# Patient Record
Sex: MALE | Race: WHITE | NOT HISPANIC OR LATINO | Employment: FULL TIME | URBAN - METROPOLITAN AREA
[De-identification: names, ages, dates, MRNs, and addresses within clinical notes are randomized per-mention and may not be internally consistent; named-entity substitution may affect disease eponyms.]

---

## 2017-01-17 ENCOUNTER — TELEPHONE (OUTPATIENT)
Dept: FAMILY MEDICINE CLINIC | Facility: CLINIC | Age: 27
End: 2017-01-17

## 2017-01-17 NOTE — TELEPHONE ENCOUNTER
Pt called-has a new pt appt scheduled with you on 2-20-17.  Went to Urgent Care at [facility] yesterday and was told his WBC's are low.  Does he need to see you sooner?  Please advise.   says no new pt appts available before this date.

## 2017-01-19 ENCOUNTER — OFFICE VISIT (OUTPATIENT)
Dept: FAMILY MEDICINE CLINIC | Facility: CLINIC | Age: 27
End: 2017-01-19

## 2017-01-19 VITALS
HEIGHT: 71 IN | SYSTOLIC BLOOD PRESSURE: 118 MMHG | DIASTOLIC BLOOD PRESSURE: 80 MMHG | WEIGHT: 172.4 LBS | RESPIRATION RATE: 16 BRPM | TEMPERATURE: 98.3 F | OXYGEN SATURATION: 100 % | BODY MASS INDEX: 24.14 KG/M2 | HEART RATE: 59 BPM

## 2017-01-19 DIAGNOSIS — Z00.00 HEALTH CARE MAINTENANCE: Primary | ICD-10-CM

## 2017-01-19 DIAGNOSIS — R21 RASH AND NONSPECIFIC SKIN ERUPTION: ICD-10-CM

## 2017-01-19 DIAGNOSIS — B34.9 VIRAL ILLNESS: ICD-10-CM

## 2017-01-19 DIAGNOSIS — D72.819 LEUKOPENIA, UNSPECIFIED TYPE: ICD-10-CM

## 2017-01-19 PROBLEM — F41.9 ANXIETY: Status: ACTIVE | Noted: 2017-01-19

## 2017-01-19 PROCEDURE — 99385 PREV VISIT NEW AGE 18-39: CPT | Performed by: FAMILY MEDICINE

## 2017-01-19 PROCEDURE — 99213 OFFICE O/P EST LOW 20 MIN: CPT | Performed by: FAMILY MEDICINE

## 2017-01-19 RX ORDER — PROPRANOLOL HYDROCHLORIDE 10 MG/1
TABLET ORAL
COMMUNITY
Start: 2017-01-06

## 2017-01-19 RX ORDER — ESCITALOPRAM OXALATE 20 MG/1
20 TABLET ORAL
COMMUNITY

## 2017-01-19 NOTE — MR AVS SNAPSHOT
Shakeel Flores   2017 8:00 AM   Office Visit    Dept Phone:  [phone]   Encounter #:  [id number]    Provider:  Stephen Maher MD   Department:  [facility] PRIMARY [facility]ARE                Your Full Care Plan              Your Updated Medication List          This list is accurate as of: 17  8:37 AM.  Always use your most recent med list.                escitalopram 20 MG tablet   Commonly known as:  LEXAPRO       propranolol 10 MG tablet   Commonly known as:  INDERAL               We Performed the Following     B. Burgdorferi Antibodies, WB Reflex     CBC & Differential     Comprehensive Metabolic Panel     Lipid Panel       You Were Diagnosed With        Codes Comments    Rash and nonspecific skin eruption    -  Primary ICD-10-CM: R21  ICD-9-CM: 782.1     Viral illness     ICD-10-CM: B34.9  ICD-9-CM: 079.99     Leukopenia, unspecified type     ICD-10-CM: D72.819  ICD-9-CM: 288.50     Health care maintenance     ICD-10-CM: Z00.00  ICD-9-CM: V70.0       Instructions     None    Patient Instructions History      Upcoming Appointments     Visit Type Date Time Department    NEW PATIENT 2017  8:00 AM Unified Office    PHYSICAL 2017 11:00 AM "[employer] Signup     [facility]ny allows you to send messages to your doctor, view your test results, renew your prescriptions, schedule appointments, and more. To sign up, go to [employer] and click on the Sign Up Now link in the New User? box. Enter your Cynny Activation Code exactly as it appears below along with the last four digits of your Social Security Number and your Date of Birth () to complete the sign-up process. If you do not sign up before the expiration date, you must request a new code.    Cynny Activation Code: [email or web address]  Expires: 2017  8:36 AM    If you have questions, you can email [email or web address] or call [phone] to  "talk to our MyChart staff. Remember, MyChart is NOT to be used for urgent needs. For medical emergencies, dial 911.               Other Info from Your Visit           Your Appointments     Feb 20, 2017 11:00 AM EST   Physical with Stephen Maher MD   [facility]IMARY [facility] (--)    [address]  [facility] [address] [phone]           Arrive 15 minutes prior to appointment.              Allergies     Cefaclor  Rash      Reason for Visit     Abnormal Lab decreased WBC's at [facility]      Vital Signs     Blood Pressure Pulse Temperature Respirations Height Weight    118/80 59 98.3 °F (36.8 °C) (Oral) 16 71\" (180.3 cm) 172 lb 6.4 oz (78.2 kg)    Oxygen Saturation Body Mass Index Smoking Status             100% 24.04 kg/m2 Never Smoker         Problems and Diagnoses Noted     Anxiety problem    Nasal inflammation due to allergen    Rash and nonspecific skin eruption    -  Primary    Viral illness        Decreased white blood cell count        Health maintenance examination            "

## 2017-01-19 NOTE — PROGRESS NOTES
Subjective   Shakeel Flores is a 26 y.o. male.     Chief Complaint   Patient presents with   • Abnormal Lab     decreased WBC's at [facility]    also here for complete physical examination.    History of Present Illness    About 11 or 12 days ago started with some headaches.  It will bother him in the morning for an hour or 2 and then get better.  Mild to moderate headaches.  Then would feel lightheaded at times when exercising.  He had decreased exercise tolerance for the better part of a week.  Some fatigue.  Then over last weekend some fever-like symptoms.  He broke out with a sweat and a rash Saturday night.  Rash mostly on the thighs and buttocks.  Does not appear to bother him too much.  Since then the fevers have broken and overall is feeling better.  He has had no specific flulike symptoms.  No sore throat.  No sinus symptoms.  No cough.  No nausea no vomiting.  No diarrhea.  No dysuria.  He does have some new sweatpants he wore but the underwear is not new.  No other known contact issues to the buttocks and thighs.  He traveled to [place] in October.  He was hiking in the [place] for a day or 2 early November, it was warm, he does not recall any insect bites.  Nor tick bites.  He was seen at the [place] urgent care center recently.  Negative flu test.  White count was 2800 with a platelet count of 144,000 normal hemoglobin.  He was told to follow-up with us for recheck.  No specific diagnosis was given.    He's also here as a new patient for complete physical examination.  He does not smoke.  He describes some alcohol use.  No drug use.  He exercises frequently.  He works as an .    He has remote history of anxiety.  He's been taking Lexapro for some time.  He uses propranolol prescribed by a psychiatrist also for intermittent social phobia.      The following portions of the patient's history were reviewed and updated as appropriate: allergies, current medications, past family history, past medical  "history, past social history, past surgical history and problem list.          Review of Systems   Constitutional: Positive for fatigue and fever. Negative for activity change.   Eyes: Negative.    Respiratory: Negative for cough, chest tightness, shortness of breath and wheezing.    Cardiovascular: Negative for chest pain, palpitations and leg swelling.   Gastrointestinal: Negative for abdominal pain, blood in stool, constipation, diarrhea, nausea and vomiting.   Endocrine: Negative.    Genitourinary: Negative for difficulty urinating, dysuria and hematuria.   Musculoskeletal: Negative.    Skin: Positive for rash.   Neurological: Positive for headaches ( now all but gone).   Psychiatric/Behavioral: Negative.  Negative for behavioral problems. The patient is not nervous/anxious.    All other systems reviewed and are negative.      Objective   Blood pressure 118/80, pulse 59, temperature 98.3 °F (36.8 °C), temperature source Oral, resp. rate 16, height 71\" (180.3 cm), weight 172 lb 6.4 oz (78.2 kg), SpO2 100 %.  Physical Exam   Constitutional: He is oriented to person, place, and time. He appears well-developed and well-nourished. No distress.   HENT:   Head: Normocephalic and atraumatic.   Right Ear: Tympanic membrane, external ear and ear canal normal.   Left Ear: Tympanic membrane, external ear and ear canal normal.   Nose: Nose normal.   Mouth/Throat: Oropharynx is clear and moist. No oropharyngeal exudate.   Eyes: Conjunctivae and EOM are normal. Pupils are equal, round, and reactive to light.   Neck: Normal range of motion. Neck supple. No tracheal deviation present. No thyromegaly present.   Cardiovascular: Normal rate, regular rhythm, normal heart sounds and intact distal pulses.    No murmur heard.  Pulmonary/Chest: Effort normal and breath sounds normal.   Abdominal: Soft. Bowel sounds are normal. He exhibits no abdominal bruit and no mass. There is no hepatosplenomegaly. There is no tenderness. No hernia. " Hernia confirmed negative in the right inguinal area and confirmed negative in the left inguinal area.   Genitourinary: Testes normal and penis normal. Tender:   Right testis shows no mass and no tenderness. Left testis shows no mass and no tenderness.   Musculoskeletal: Normal range of motion.   Lymphadenopathy:     He has no cervical adenopathy.   Neurological: He is alert and oriented to person, place, and time. He exhibits normal muscle tone.   Skin: Skin is warm. Rash noted.   There is an indurated erythematous diffuse rash along the posterior buttocks and lateral thighs.  No distinct hives.  No petechiae.   Psychiatric: He has a normal mood and affect. His behavior is normal. Judgment and thought content normal.       Assessment/Plan   Shakeel was seen today for abnormal lab.    Diagnoses and all orders for this visit:    Health care maintenance  -     CBC & Differential  -     Comprehensive Metabolic Panel  -     Lipid Panel    Rash and nonspecific skin eruption  -     CBC & Differential  -     B. Burgdorferi Antibodies, WB Reflex    Viral illness  -     CBC & Differential    Leukopenia, unspecified type  -     CBC & Differential  -     B. Burgdorferi Antibodies, WB Reflex      Here for annual health care maintenance examination.  Lipid panel ordered.  Continue exercising.  No restriction activity at this time.    Probable recent viral syndrome.  The rash is either a contact irritation, or more likely a viral exanthem.  We'll continue to observe.  The leukopenia is likely related to the recent viral syndrome.  I'm rechecking a CBC.  He did do some hiking about 2 months ago.  I'm recommending a test for Lyme disease, although this would be a atypical presentation.  He is going to see me back in one month for recheck.  If he is 100% better he may cancel the appointment.

## 2017-01-20 DIAGNOSIS — R79.89 ELEVATED LFTS: Primary | ICD-10-CM

## 2017-01-20 LAB
ALBUMIN SERPL-MCNC: 4.3 G/DL (ref 3.5–5.2)
ALBUMIN/GLOB SERPL: 1.7 G/DL
ALP SERPL-CCNC: 90 U/L (ref 39–117)
ALT SERPL-CCNC: 144 U/L (ref 1–41)
AST SERPL-CCNC: 209 U/L (ref 1–40)
B BURGDOR IGG+IGM SER-ACNC: <0.91 ISR (ref 0–0.9)
B BURGDOR IGM SER IA-ACNC: <0.8 INDEX (ref 0–0.79)
BASOPHILS # BLD MANUAL: 0.06 10*3/MM3 (ref 0–0.2)
BASOPHILS NFR BLD MANUAL: 2 % (ref 0–1.5)
BILIRUB SERPL-MCNC: 0.4 MG/DL (ref 0.1–1.2)
BUN SERPL-MCNC: 9 MG/DL (ref 6–20)
BUN/CREAT SERPL: 9.1 (ref 7–25)
CALCIUM SERPL-MCNC: 9.1 MG/DL (ref 8.6–10.5)
CHLORIDE SERPL-SCNC: 101 MMOL/L (ref 98–107)
CHOLEST SERPL-MCNC: 105 MG/DL (ref 0–200)
CO2 SERPL-SCNC: 29.3 MMOL/L (ref 22–29)
CREAT SERPL-MCNC: 0.99 MG/DL (ref 0.76–1.27)
DIFFERENTIAL COMMENT: ABNORMAL
EOSINOPHIL # BLD MANUAL: 0.18 10*3/MM3 (ref 0–0.7)
EOSINOPHIL NFR BLD MANUAL: 6 % (ref 0.3–6.2)
ERYTHROCYTE [DISTWIDTH] IN BLOOD BY AUTOMATED COUNT: 12.5 % (ref 11.5–14.5)
GLOBULIN SER CALC-MCNC: 2.5 GM/DL
GLUCOSE SERPL-MCNC: 93 MG/DL (ref 65–99)
HCT VFR BLD AUTO: 44.5 % (ref 40.4–52.2)
HDLC SERPL-MCNC: 33 MG/DL (ref 40–60)
HGB BLD-MCNC: 15 G/DL (ref 13.7–17.6)
LDLC SERPL CALC-MCNC: 53 MG/DL (ref 0–100)
LYMPHOCYTES # BLD MANUAL: 1.45 10*3/MM3 (ref 0.9–4.8)
LYMPHOCYTES NFR BLD MANUAL: 47 % (ref 19.6–45.3)
MCH RBC QN AUTO: 32.6 PG (ref 27–32.7)
MCHC RBC AUTO-ENTMCNC: 33.7 G/DL (ref 32.6–36.4)
MCV RBC AUTO: 96.7 FL (ref 79.8–96.2)
MONOCYTES # BLD MANUAL: 0.46 10*3/MM3 (ref 0.2–1.2)
MONOCYTES NFR BLD MANUAL: 15 % (ref 5–12)
NEUTROPHILS # BLD MANUAL: 0.89 10*3/MM3 (ref 1.9–8.1)
NEUTROPHILS NFR BLD MANUAL: 29 % (ref 42.7–76)
PLATELET # BLD AUTO: 128 10*3/MM3 (ref 140–500)
PLATELET BLD QL SMEAR: ABNORMAL
POTASSIUM SERPL-SCNC: 4.3 MMOL/L (ref 3.5–5.2)
PROT SERPL-MCNC: 6.8 G/DL (ref 6–8.5)
RBC # BLD AUTO: 4.6 10*6/MM3 (ref 4.6–6)
RBC MORPH BLD: ABNORMAL
SODIUM SERPL-SCNC: 142 MMOL/L (ref 136–145)
TRIGL SERPL-MCNC: 94 MG/DL (ref 0–150)
VLDLC SERPL CALC-MCNC: 18.8 MG/DL (ref 5–40)
WBC # BLD AUTO: 3.08 10*3/MM3 (ref 4.5–10.7)

## 2017-01-20 NOTE — PROGRESS NOTES
Nivia, please make sure lab work it's done Monday thank you ....... message for patient follows ......The white blood cell count has come up.  The liver enzymes are elevated.  Given the rash, this is suggestive of mono.  Or infectious mononucleosis.  It is a virus and gets better on its own.  However I want to also check for other causes of infectious hepatitis.  I have instructed Nivia my assistant to set up the lab work for early next week.  You're welcome to see me in the office Monday.  In addition feel free to get an urgent care center this weekend if feeling worse.  I just try calling you.  I left a voicemail.

## 2017-01-25 ENCOUNTER — RESULTS ENCOUNTER (OUTPATIENT)
Dept: FAMILY MEDICINE CLINIC | Facility: CLINIC | Age: 27
End: 2017-01-25

## 2017-01-25 DIAGNOSIS — R79.89 ELEVATED LFTS: ICD-10-CM

## 2017-01-28 LAB
EBV EA IGG SER-ACNC: 118 U/ML (ref 0–8.9)
EBV NA IGG SER IA-ACNC: <18 U/ML (ref 0–17.9)
EBV VCA IGG SER IA-ACNC: 116 U/ML (ref 0–17.9)
EBV VCA IGM SER IA-ACNC: >160 U/ML (ref 0–35.9)
HAV IGM SERPL QL IA: NEGATIVE
HBV CORE IGM SERPL QL IA: NEGATIVE
HBV SURFACE AG SERPL QL IA: NEGATIVE
HCV AB S/CO SERPL IA: <0.1 S/CO RATIO (ref 0–0.9)
SERVICE CMNT-IMP: ABNORMAL

## 2017-01-29 NOTE — PROGRESS NOTES
Please call patient.....Definitely new infectious mononucleosis. Rash is consistent with Mono. Infectious hepatitis A,B,C  Tests are negative. If not feeling better, want to see in office.

## 2021-04-16 ENCOUNTER — BULK ORDERING (OUTPATIENT)
Dept: CASE MANAGEMENT | Facility: OTHER | Age: 31
End: 2021-04-16

## 2021-04-16 DIAGNOSIS — Z23 IMMUNIZATION DUE: ICD-10-CM

## 2025-02-12 ENCOUNTER — OFFICE VISIT (OUTPATIENT)
Dept: FAMILY MEDICINE CLINIC | Facility: CLINIC | Age: 35
End: 2025-02-12
Payer: COMMERCIAL

## 2025-02-12 VITALS
BODY MASS INDEX: 22.96 KG/M2 | OXYGEN SATURATION: 98 % | HEART RATE: 58 BPM | WEIGHT: 164 LBS | HEIGHT: 71 IN | SYSTOLIC BLOOD PRESSURE: 110 MMHG | RESPIRATION RATE: 17 BRPM | DIASTOLIC BLOOD PRESSURE: 82 MMHG

## 2025-02-12 DIAGNOSIS — Z30.09 VASECTOMY EVALUATION: ICD-10-CM

## 2025-02-12 DIAGNOSIS — Z00.00 ANNUAL PHYSICAL EXAM: Primary | ICD-10-CM

## 2025-02-12 RX ORDER — FLUOXETINE 10 MG/1
10 CAPSULE ORAL DAILY
COMMUNITY
Start: 2020-01-10

## 2025-02-12 NOTE — PROGRESS NOTES
"Chief Complaint  Chief Complaint   Patient presents with    [facility]     New Pt     Annual Exam     Physical        Subjective    History of Present Illness        Shakeel Flores presents to [facility] for   History of Present Illness  Shakeel Flores is a 34 y.o. male here for his annual physical with me. Shakeel is here for coordination of medical care, to discuss health maintenance, disease prevention as well as to followup on medical problems. Patient has been followed by me since 2025. Patient's last CPE was about 7 years ago. Activity level is moderate. Exercises 2 per week. Appetite is good. Feels well with none complaints. Energy level is good. Sleeps well.      History of Present Illness      Objective   Vital Signs:   Visit Vitals  /82   Pulse 58   Resp 17   Ht 180.3 cm (71\")   Wt 74.4 kg (164 lb)   SpO2 98%   BMI 22.87 kg/m²          BMI is within normal parameters. No other follow-up for BMI required.     Physical Exam  Vitals reviewed.   Constitutional:       Appearance: Normal appearance. He is well-developed.   HENT:      Head: Normocephalic and atraumatic.      Right Ear: Tympanic membrane, ear canal and external ear normal.      Left Ear: Tympanic membrane, ear canal and external ear normal.      Nose: Nose normal.      Mouth/Throat:      Mouth: Mucous membranes are moist.      Pharynx: Oropharynx is clear.   Eyes:      General: Lids are normal.      Conjunctiva/sclera: Conjunctivae normal.      Pupils: Pupils are equal, round, and reactive to light.   Cardiovascular:      Rate and Rhythm: Normal rate and regular rhythm.      Pulses: Normal pulses.      Heart sounds: Normal heart sounds.   Pulmonary:      Effort: Pulmonary effort is normal. No respiratory distress.      Breath sounds: Normal breath sounds.   Abdominal:      General: Abdomen is flat. Bowel sounds are normal.      Palpations: Abdomen is soft.   Musculoskeletal:         General: Normal " range of motion.      Cervical back: Normal range of motion and neck supple.   Skin:     General: Skin is warm and dry.      Capillary Refill: Capillary refill takes less than 2 seconds.   Neurological:      General: No focal deficit present.      Mental Status: He is alert and oriented to person, place, and time. Mental status is at baseline.   Psychiatric:         Mood and Affect: Mood normal.         Behavior: Behavior normal.         Thought Content: Thought content normal.         Judgment: Judgment normal.        Physical Exam           Result Review :  Results                            Assessment and Plan      Diagnoses and all orders for this visit:    1. Annual physical exam (Primary)  Assessment & Plan:  Discussed injury prevention, diet and exercise, safe sexual practices, and screening for common diseases. Encouraged use of sunscreen and seatbelts. Avoidance of tobacco encouraged. Limitation or avoidance of alcohol encouraged. Recommend yearly dental and eye exams. Also discussed monitoring of blood pressure, lipids.    Orders:  -     CBC & Differential  -     Comprehensive Metabolic Panel  -     TSH  -     Lipid Panel With / Chol / HDL Ratio  -     Urinalysis With Microscopic - Urine, Clean Catch    2. Vasectomy evaluation  -     Ambulatory Referral to Urology    Other orders  -     Microscopic Examination -       Assessment & Plan             Follow Up   No follow-ups on file.  Patient was given instructions and counseling regarding his condition or for health maintenance advice. Please see specific information pulled into the AVS if appropriate.

## 2025-02-14 ENCOUNTER — PATIENT ROUNDING (BHMG ONLY) (OUTPATIENT)
Dept: FAMILY MEDICINE CLINIC | Facility: CLINIC | Age: 35
End: 2025-02-14
Payer: COMMERCIAL

## 2025-02-14 NOTE — PROGRESS NOTES
A My-Chart message has been sent to the patient for PATIENT ROUNDING with [facility]

## 2025-02-20 LAB
ALBUMIN SERPL-MCNC: 4.5 G/DL (ref 3.5–5.2)
ALBUMIN/GLOB SERPL: 1.7 G/DL
ALP SERPL-CCNC: 61 U/L (ref 39–117)
ALT SERPL-CCNC: 12 U/L (ref 1–41)
APPEARANCE UR: CLEAR
AST SERPL-CCNC: 39 U/L (ref 1–40)
BACTERIA #/AREA URNS HPF: NORMAL /HPF
BASOPHILS # BLD AUTO: 0.02 10*3/MM3 (ref 0–0.2)
BASOPHILS NFR BLD AUTO: 0.5 % (ref 0–1.5)
BILIRUB SERPL-MCNC: 0.7 MG/DL (ref 0–1.2)
BILIRUB UR QL STRIP: NEGATIVE
BUN SERPL-MCNC: 14 MG/DL (ref 6–20)
BUN/CREAT SERPL: 15.7 (ref 7–25)
CALCIUM SERPL-MCNC: 9.5 MG/DL (ref 8.6–10.5)
CASTS URNS MICRO: NORMAL
CHLORIDE SERPL-SCNC: 105 MMOL/L (ref 98–107)
CHOLEST SERPL-MCNC: 145 MG/DL (ref 0–200)
CHOLEST/HDLC SERPL: 2.9 {RATIO}
CO2 SERPL-SCNC: 27.5 MMOL/L (ref 22–29)
COLOR UR: YELLOW
CREAT SERPL-MCNC: 0.89 MG/DL (ref 0.76–1.27)
EGFRCR SERPLBLD CKD-EPI 2021: 114.6 ML/MIN/1.73
EOSINOPHIL # BLD AUTO: 0.02 10*3/MM3 (ref 0–0.4)
EOSINOPHIL NFR BLD AUTO: 0.5 % (ref 0.3–6.2)
EPI CELLS #/AREA URNS HPF: NORMAL /HPF
ERYTHROCYTE [DISTWIDTH] IN BLOOD BY AUTOMATED COUNT: 12.4 % (ref 12.3–15.4)
GLOBULIN SER CALC-MCNC: 2.7 GM/DL
GLUCOSE SERPL-MCNC: 79 MG/DL (ref 65–99)
GLUCOSE UR QL STRIP: NEGATIVE
HCT VFR BLD AUTO: 45.7 % (ref 37.5–51)
HDLC SERPL-MCNC: 50 MG/DL (ref 40–60)
HGB BLD-MCNC: 15.4 G/DL (ref 13–17.7)
HGB UR QL STRIP: NEGATIVE
IMM GRANULOCYTES # BLD AUTO: 0.01 10*3/MM3 (ref 0–0.05)
IMM GRANULOCYTES NFR BLD AUTO: 0.2 % (ref 0–0.5)
KETONES UR QL STRIP: NEGATIVE
LDLC SERPL CALC-MCNC: 83 MG/DL (ref 0–100)
LEUKOCYTE ESTERASE UR QL STRIP: NEGATIVE
LYMPHOCYTES # BLD AUTO: 2.11 10*3/MM3 (ref 0.7–3.1)
LYMPHOCYTES NFR BLD AUTO: 49.9 % (ref 19.6–45.3)
MCH RBC QN AUTO: 31.6 PG (ref 26.6–33)
MCHC RBC AUTO-ENTMCNC: 33.7 G/DL (ref 31.5–35.7)
MCV RBC AUTO: 93.8 FL (ref 79–97)
MONOCYTES # BLD AUTO: 0.35 10*3/MM3 (ref 0.1–0.9)
MONOCYTES NFR BLD AUTO: 8.3 % (ref 5–12)
NEUTROPHILS # BLD AUTO: 1.72 10*3/MM3 (ref 1.7–7)
NEUTROPHILS NFR BLD AUTO: 40.6 % (ref 42.7–76)
NITRITE UR QL STRIP: NEGATIVE
NRBC BLD AUTO-RTO: 0 /100 WBC (ref 0–0.2)
PH UR STRIP: 6.5 [PH] (ref 5–8)
PLATELET # BLD AUTO: 269 10*3/MM3 (ref 140–450)
POTASSIUM SERPL-SCNC: 4.3 MMOL/L (ref 3.5–5.2)
PROT SERPL-MCNC: 7.2 G/DL (ref 6–8.5)
PROT UR QL STRIP: NEGATIVE
RBC # BLD AUTO: 4.87 10*6/MM3 (ref 4.14–5.8)
RBC #/AREA URNS HPF: NORMAL /HPF
SODIUM SERPL-SCNC: 141 MMOL/L (ref 136–145)
SP GR UR STRIP: 1.02 (ref 1–1.03)
TRIGL SERPL-MCNC: 55 MG/DL (ref 0–150)
TSH SERPL DL<=0.005 MIU/L-ACNC: 2 UIU/ML (ref 0.27–4.2)
UROBILINOGEN UR STRIP-MCNC: NORMAL MG/DL
VLDLC SERPL CALC-MCNC: 12 MG/DL (ref 5–40)
WBC # BLD AUTO: 4.23 10*3/MM3 (ref 3.4–10.8)
WBC #/AREA URNS HPF: NORMAL /HPF